# Patient Record
(demographics unavailable — no encounter records)

---

## 2025-01-03 NOTE — PHYSICAL EXAM
[Normal Thyroid] : the thyroid was normal [Respiratory Effort] : normal respiratory effort [Normal Rate and Rhythm] : normal rate and rhythm [2+] : left 2+ [] : of the right leg [Ankle Swelling On The Right] : mild [No HSM] : no hepatosplenomegaly [Skin Ulcer] : ulcer [JVD] : no jugular venous distention  [Ankle Swelling (On Exam)] : not present [Varicose Veins Of Lower Extremities] : not present [Abdomen Masses] : No abdominal masses [Abdomen Tenderness] : ~T ~M No abdominal tenderness [Purpura] : no purpura  [Petechiae] : no petechiae [Skin Induration] : no induration [de-identified] : Thin habitus, NAD [de-identified] : NC/AT, anicteric [de-identified] : FROM throughout, strength 5/5x4, no palpable cords in LEs b/l [de-identified] : New wound granulating well w/hyperemia, nontender to palpation, no drainage noted from the wound.

## 2025-01-03 NOTE — PHYSICAL EXAM
[Normal Thyroid] : the thyroid was normal [Respiratory Effort] : normal respiratory effort [Normal Rate and Rhythm] : normal rate and rhythm [2+] : left 2+ [] : of the right leg [Ankle Swelling On The Right] : mild [No HSM] : no hepatosplenomegaly [Skin Ulcer] : ulcer [JVD] : no jugular venous distention  [Ankle Swelling (On Exam)] : not present [Varicose Veins Of Lower Extremities] : not present [Abdomen Masses] : No abdominal masses [Abdomen Tenderness] : ~T ~M No abdominal tenderness [Purpura] : no purpura  [Petechiae] : no petechiae [Skin Induration] : no induration [de-identified] : Thin habitus, NAD [de-identified] : NC/AT, anicteric [de-identified] : FROM throughout, strength 5/5x4, no palpable cords in LEs b/l [de-identified] : New wound granulating well w/hyperemia, nontender to palpation, no drainage noted from the wound.

## 2025-01-03 NOTE — PROCEDURE
[FreeTextEntry1] : Wound cleansed w/chlorhexidine/peroxide and dressed w/xeroform-->dry dressing and kerlix/coban from ankle to tibial tuberosity.

## 2025-01-03 NOTE — ADDENDUM
[FreeTextEntry1] : Patient was seen as per the physician's primary plan of care.  Throughout this patient's visit, the supervising physician-Adrian Gutierrez MD, was available to assist in the care and medical management of the patient.

## 2025-01-03 NOTE — HISTORY OF PRESENT ILLNESS
[FreeTextEntry1] : 80yoF retired nurse w/PMHx of HTN, HLD returns for reevaluation of a previously chronic R anterior ankle wound which has healed but notes a new anterior ankle ulcer now 1wk old.  VNS has been dressing the wound TIW w/some improvement over the past 3d.  Pt NOT compliant w/compression but has been leaving dressings in place, VNS is alternating between Mupirocin-->dry dressings and Medihoney-->dry dressings.

## 2025-01-03 NOTE — DATA REVIEWED
[FreeTextEntry1] : Previous venous duplex performed at last visit to evaluate for venous reflux revealed no evidence of thrombus, no venous reflux\par  \par

## 2025-01-03 NOTE — ASSESSMENT
[FreeTextEntry1] : 80yoF retired nurse w/PMHx of HTN, HLD returns for reevaluation of a previously chronic R anterior ankle wound which has healed but notes a new anterior ankle ulcer now 1wk old.  VNS has been dressing the wound TIW w/some improvement over the past 3d.  Pt NOT compliant w/compression but has been leaving dressings in place, VNS is alternating between Mupirocin-->dry dressings and Medihoney-->dry dressings.  New wound granulating well w/hyperemia, nontender to palpation, no drainage noted from the wound.  Wound cleansed w/chlorhexidine/peroxide and dressed w/xeroform-->dry dressing and kerlix/coban from ankle to tibial tuberosity.  Recommended to daughter that VNS continue TIW dressing changes w/xeroform/dry dressings/compression until wound is healed.  Pt will RTO in 4wks.

## 2025-02-07 NOTE — HISTORY OF PRESENT ILLNESS
[FreeTextEntry1] : 80yoF retired nurse w/PMHx of HTN, HLD returns for reevaluation of a previously chronic R anterior ankle wound which has healed but notes a new anterior ankle ulcer now 1wk old.  VNS has been dressing the wound TIW w/some improvement over the past few weeks.  Pt NOT compliant w/compression but has been leaving dressings in place, VNS is alternating between Mupirocin-->dry dressings and Medihoney-->dry dressings.

## 2025-02-07 NOTE — PHYSICAL EXAM
[Normal Thyroid] : the thyroid was normal [Respiratory Effort] : normal respiratory effort [Normal Rate and Rhythm] : normal rate and rhythm [2+] : left 2+ [] : of the right leg [Ankle Swelling On The Right] : mild [No HSM] : no hepatosplenomegaly [Skin Ulcer] : ulcer [JVD] : no jugular venous distention  [Ankle Swelling (On Exam)] : not present [Varicose Veins Of Lower Extremities] : not present [Abdomen Masses] : No abdominal masses [Abdomen Tenderness] : ~T ~M No abdominal tenderness [Purpura] : no purpura  [Petechiae] : no petechiae [Skin Induration] : no induration [de-identified] : Thin habitus, NAD [de-identified] : NC/AT, anicteric [de-identified] : FROM throughout, strength 5/5x4, no palpable cords in LEs b/l [de-identified] : Excoriated skin noted throughout the RLE but no open ulcers noted, no drainage or erythema appreciated.

## 2025-02-07 NOTE — PHYSICAL EXAM
[Normal Thyroid] : the thyroid was normal [Respiratory Effort] : normal respiratory effort [Normal Rate and Rhythm] : normal rate and rhythm [2+] : left 2+ [] : of the right leg [Ankle Swelling On The Right] : mild [No HSM] : no hepatosplenomegaly [Skin Ulcer] : ulcer [JVD] : no jugular venous distention  [Ankle Swelling (On Exam)] : not present [Varicose Veins Of Lower Extremities] : not present [Abdomen Masses] : No abdominal masses [Abdomen Tenderness] : ~T ~M No abdominal tenderness [Purpura] : no purpura  [Petechiae] : no petechiae [Skin Induration] : no induration [de-identified] : Thin habitus, NAD [de-identified] : NC/AT, anicteric [de-identified] : FROM throughout, strength 5/5x4, no palpable cords in LEs b/l [de-identified] : Excoriated skin noted throughout the RLE but no open ulcers noted, no drainage or erythema appreciated.